# Patient Record
Sex: FEMALE | Race: ASIAN | NOT HISPANIC OR LATINO | URBAN - METROPOLITAN AREA
[De-identification: names, ages, dates, MRNs, and addresses within clinical notes are randomized per-mention and may not be internally consistent; named-entity substitution may affect disease eponyms.]

---

## 2023-04-03 ENCOUNTER — WEB ENCOUNTER (OUTPATIENT)
Dept: URBAN - METROPOLITAN AREA CLINIC 88 | Facility: CLINIC | Age: 43
End: 2023-04-03

## 2023-04-04 ENCOUNTER — OFFICE VISIT (OUTPATIENT)
Dept: URBAN - METROPOLITAN AREA CLINIC 88 | Facility: CLINIC | Age: 43
End: 2023-04-04
Payer: COMMERCIAL

## 2023-04-04 ENCOUNTER — LAB OUTSIDE AN ENCOUNTER (OUTPATIENT)
Dept: URBAN - METROPOLITAN AREA CLINIC 88 | Facility: CLINIC | Age: 43
End: 2023-04-04

## 2023-04-04 VITALS
WEIGHT: 153.4 LBS | HEIGHT: 61 IN | TEMPERATURE: 97.7 F | SYSTOLIC BLOOD PRESSURE: 128 MMHG | DIASTOLIC BLOOD PRESSURE: 84 MMHG | HEART RATE: 84 BPM | BODY MASS INDEX: 28.96 KG/M2

## 2023-04-04 DIAGNOSIS — K58.2 IRRITABLE BOWEL SYNDROME WITH BOTH CONSTIPATION AND DIARRHEA: ICD-10-CM

## 2023-04-04 DIAGNOSIS — R19.7 DIARRHEA OF PRESUMED INFECTIOUS ORIGIN: ICD-10-CM

## 2023-04-04 DIAGNOSIS — R63.4 UNEXPLAINED WEIGHT LOSS: ICD-10-CM

## 2023-04-04 DIAGNOSIS — R11.0 NAUSEA: ICD-10-CM

## 2023-04-04 DIAGNOSIS — R10.13 EPIGASTRIC ABDOMINAL PAIN: ICD-10-CM

## 2023-04-04 DIAGNOSIS — R10.11 RUQ ABDOMINAL PAIN: ICD-10-CM

## 2023-04-04 PROBLEM — 10743008: Status: ACTIVE | Noted: 2023-04-04

## 2023-04-04 PROCEDURE — 99204 OFFICE O/P NEW MOD 45 MIN: CPT | Performed by: NURSE PRACTITIONER

## 2023-04-04 RX ORDER — PANTOPRAZOLE SODIUM 40 MG/1
1 TABLET TABLET, DELAYED RELEASE ORAL
Qty: 90 | Refills: 1 | OUTPATIENT
Start: 2023-04-04

## 2023-04-04 NOTE — PHYSICAL EXAM GASTROINTESTINAL
Abdomen , soft, RUQ, epigastric tenderness, nondistended , no guarding or rigidity , no masses palpable , normal bowel sounds , Liver and Spleen: no hepatosplenomegaly

## 2023-04-14 LAB
CALPROTECTIN, FECAL: 534
CAMPYLOBACTER SPP. AG,EIA: (no result)
CLOSTRIDIUM DIFFICILE: (no result)
SALMONELLA AND SHIGELLA, CULTURE: (no result)
SHIGA TOXINS, EIA W/RFL TO E.COLI O157 CULTURE: (no result)

## 2023-04-21 ENCOUNTER — TELEPHONE ENCOUNTER (OUTPATIENT)
Dept: URBAN - METROPOLITAN AREA CLINIC 70 | Facility: CLINIC | Age: 43
End: 2023-04-21

## 2023-04-21 ENCOUNTER — LAB OUTSIDE AN ENCOUNTER (OUTPATIENT)
Dept: URBAN - METROPOLITAN AREA CLINIC 70 | Facility: CLINIC | Age: 43
End: 2023-04-21

## 2023-04-26 ENCOUNTER — OFFICE VISIT (OUTPATIENT)
Dept: URBAN - METROPOLITAN AREA SURGERY CENTER 23 | Facility: SURGERY CENTER | Age: 43
End: 2023-04-26
Payer: COMMERCIAL

## 2023-04-26 DIAGNOSIS — K29.60 ADENOPAPILLOMATOSIS GASTRICA: ICD-10-CM

## 2023-04-26 DIAGNOSIS — R19.7 ACUTE DIARRHEA: ICD-10-CM

## 2023-04-26 DIAGNOSIS — R10.84 ABDOMINAL CRAMPING, GENERALIZED: ICD-10-CM

## 2023-04-26 DIAGNOSIS — B96.81 BACTERIAL INFECTION DUE TO H. PYLORI: ICD-10-CM

## 2023-04-26 PROCEDURE — 43239 EGD BIOPSY SINGLE/MULTIPLE: CPT | Performed by: INTERNAL MEDICINE

## 2023-04-26 PROCEDURE — G8907 PT DOC NO EVENTS ON DISCHARG: HCPCS | Performed by: INTERNAL MEDICINE

## 2023-04-26 PROCEDURE — 45380 COLONOSCOPY AND BIOPSY: CPT | Performed by: INTERNAL MEDICINE

## 2023-04-26 RX ORDER — PANTOPRAZOLE SODIUM 40 MG/1
1 TABLET TABLET, DELAYED RELEASE ORAL
Qty: 90 | Refills: 1 | Status: ACTIVE | COMMUNITY
Start: 2023-04-04

## 2023-05-03 ENCOUNTER — TELEPHONE ENCOUNTER (OUTPATIENT)
Dept: URBAN - METROPOLITAN AREA CLINIC 70 | Facility: CLINIC | Age: 43
End: 2023-05-03

## 2023-05-03 RX ORDER — DICYCLOMINE HYDROCHLORIDE 10 MG/1
1 CAPSULE CAPSULE ORAL
Qty: 60 | Refills: 1 | OUTPATIENT
Start: 2023-05-04 | End: 2023-07-03

## 2023-05-04 ENCOUNTER — LAB OUTSIDE AN ENCOUNTER (OUTPATIENT)
Dept: URBAN - METROPOLITAN AREA CLINIC 70 | Facility: CLINIC | Age: 43
End: 2023-05-04

## 2023-05-04 PROBLEM — 35298007: Status: ACTIVE | Noted: 2023-05-04

## 2023-05-24 ENCOUNTER — CLAIMS CREATED FROM THE CLAIM WINDOW (OUTPATIENT)
Dept: URBAN - METROPOLITAN AREA CLINIC 88 | Facility: CLINIC | Age: 43
End: 2023-05-24
Payer: COMMERCIAL

## 2023-05-24 VITALS
BODY MASS INDEX: 28.26 KG/M2 | SYSTOLIC BLOOD PRESSURE: 119 MMHG | WEIGHT: 153.6 LBS | HEIGHT: 62 IN | TEMPERATURE: 97.9 F | HEART RATE: 81 BPM | DIASTOLIC BLOOD PRESSURE: 82 MMHG

## 2023-05-24 DIAGNOSIS — A04.8 HELICOBACTER PYLORI (H. PYLORI) INFECTION: ICD-10-CM

## 2023-05-24 DIAGNOSIS — R10.13 EPIGASTRIC ABDOMINAL PAIN: ICD-10-CM

## 2023-05-24 DIAGNOSIS — K58.1 IRRITABLE BOWEL SYNDROME WITH CONSTIPATION: ICD-10-CM

## 2023-05-24 DIAGNOSIS — K21.9 GASTROESOPHAGEAL REFLUX DISEASE WITHOUT ESOPHAGITIS: ICD-10-CM

## 2023-05-24 PROBLEM — 440630006: Status: ACTIVE | Noted: 2023-05-24

## 2023-05-24 PROCEDURE — 99214 OFFICE O/P EST MOD 30 MIN: CPT | Performed by: NURSE PRACTITIONER

## 2023-05-24 PROCEDURE — 99214 OFFICE O/P EST MOD 30 MIN: CPT | Performed by: INTERNAL MEDICINE

## 2023-05-24 RX ORDER — BISMUTH SUBCITRATE POTASSIUM, METRONIDAZOLE, TETRACYCLINE HYDROCHLORIDE 140; 125; 125 MG/1; MG/1; MG/1
3 CAPSULES AFTER MEALS AND AT BEDTIME CAPSULE ORAL
Qty: 120 | OUTPATIENT
Start: 2023-05-24 | End: 2023-06-03

## 2023-05-24 RX ORDER — FLUCONAZOLE 150 MG/1
1 TABLET TABLET ORAL
Qty: 2 | Refills: 0 | OUTPATIENT
Start: 2023-05-24 | End: 2023-05-26

## 2023-05-24 RX ORDER — DICYCLOMINE HYDROCHLORIDE 10 MG/1
1 CAPSULE CAPSULE ORAL
Qty: 60 | Refills: 1 | Status: ACTIVE | COMMUNITY
Start: 2023-05-04 | End: 2023-07-03

## 2023-05-24 RX ORDER — PANTOPRAZOLE SODIUM 40 MG/1
1 TABLET TABLET, DELAYED RELEASE ORAL
OUTPATIENT
Start: 2023-04-04

## 2023-05-24 RX ORDER — PANTOPRAZOLE SODIUM 40 MG/1
1 TABLET TABLET, DELAYED RELEASE ORAL
Qty: 90 | Refills: 1 | Status: ACTIVE | COMMUNITY
Start: 2023-04-04

## 2023-05-24 NOTE — HPI-TODAY'S VISIT:
Patient presents for follow up visit after undergoing EGD and colonoscopy procedures on 04/26/2023.  EGD:  Normal esophagus, h. pylori gastritis, normal duodenum (benign bx). Colonoscopy:  Diverticulosis in descending and ascending colon, normal TI (benign), IH and benign random biopsies.   RUQ  04/06/2023:  cholelithiasis w/o cholecystitis and fatty liver. Continues to voice intermittent pain (cramping) to epigastric region intermittently.  States pain became intense 2 days after procedure before gradually improving.  Currently describes it as a tolerable or "bearable" that can intensify at random times.  Currently defecation occurs once every other day wtih stools described as being type 1 on stool scale.  Initial use of once a day Benefiber lead to improvement in this but feels has become less effective.  With adequate release of bowels her symptoms improve slightly.  Patient has vacation planned for next week and has questiosn about starting antibiotics.

## 2023-05-24 NOTE — PHYSICAL EXAM GASTROINTESTINAL
Abdomen , soft, epigastric tenderness, nondistended , no guarding or rigidity , no masses palpable , normal bowel sounds , Liver and Spleen:  no hepatosplenomegaly

## 2023-05-24 NOTE — HPI-OTHER HISTORIES
--------------------------------------------------------------------------- Last office note 04/04/2023: Presents today, along with spouse, for evaluation of epigastric pain.  This pain has been present intermittent x 6 months.  Describes pain as a burning sensation that appears to be worse during the night.  Duration:  hours.  Associated symptoms:  15 lb over the last 2 months, nausea, occasional vomiting, changes in bowel habits, excessive flatulence and bloating.   Alleviating symptoms:  treatment for h. pylori by PCP improved symptoms slightly, Pepto-Bismol, defecation and belching.   Aggravating:  eating in general.   Also voices constipation alternating diarrhea that has been occurring over the month.  Voices hx of constipation with defecation occurring once every 2-3 days.  May not achieve a complete sense of evacuation.  First stool is usually formed followed by non-bloody loose stools.  Diarrhea episodes may last for a few days before gradually subsiding.  Denies family hx of colon cancer, IBD.  Started taking oral iron tablets a month ago and has noticed a darker color to stool.  Voices a normal KUB by her PCP a month ago.    Denies prior colonoscopy or EGD.

## 2023-06-14 ENCOUNTER — TELEPHONE ENCOUNTER (OUTPATIENT)
Dept: URBAN - METROPOLITAN AREA CLINIC 70 | Facility: CLINIC | Age: 43
End: 2023-06-14

## 2023-07-05 LAB
H PYLORI BREATH TEST: NOT DETECTED
H. PYLORI BREATH TEST: NOT DETECTED
INTERPRETATION: NOT DETECTED

## 2023-07-06 ENCOUNTER — TELEPHONE ENCOUNTER (OUTPATIENT)
Dept: URBAN - METROPOLITAN AREA CLINIC 70 | Facility: CLINIC | Age: 43
End: 2023-07-06

## 2023-07-06 RX ORDER — PANTOPRAZOLE SODIUM 40 MG/1
1 TABLET TABLET, DELAYED RELEASE ORAL
Status: ACTIVE | COMMUNITY
Start: 2023-04-04

## 2023-07-06 RX ORDER — PLECANATIDE 3 MG/1
1 TABLET TABLET ORAL
Qty: 90 | Refills: 3 | OUTPATIENT
Start: 2023-07-10 | End: 2024-07-04

## 2023-07-10 ENCOUNTER — LAB OUTSIDE AN ENCOUNTER (OUTPATIENT)
Dept: URBAN - METROPOLITAN AREA CLINIC 70 | Facility: CLINIC | Age: 43
End: 2023-07-10

## 2023-07-10 PROBLEM — 70342003: Status: ACTIVE | Noted: 2023-07-10

## 2023-07-12 ENCOUNTER — OFFICE VISIT (OUTPATIENT)
Dept: URBAN - METROPOLITAN AREA CLINIC 88 | Facility: CLINIC | Age: 43
End: 2023-07-12

## 2023-07-31 ENCOUNTER — OFFICE VISIT (OUTPATIENT)
Dept: URBAN - METROPOLITAN AREA CLINIC 88 | Facility: CLINIC | Age: 43
End: 2023-07-31
Payer: COMMERCIAL

## 2023-07-31 ENCOUNTER — WEB ENCOUNTER (OUTPATIENT)
Dept: URBAN - METROPOLITAN AREA CLINIC 88 | Facility: CLINIC | Age: 43
End: 2023-07-31

## 2023-07-31 VITALS
HEART RATE: 88 BPM | BODY MASS INDEX: 28.63 KG/M2 | WEIGHT: 155.6 LBS | DIASTOLIC BLOOD PRESSURE: 79 MMHG | TEMPERATURE: 97.7 F | SYSTOLIC BLOOD PRESSURE: 118 MMHG | HEIGHT: 62 IN

## 2023-07-31 DIAGNOSIS — K58.1 IRRITABLE BOWEL SYNDROME WITH CONSTIPATION: ICD-10-CM

## 2023-07-31 DIAGNOSIS — K80.20 CALCULUS OF GALLBLADDER WITHOUT CHOLECYSTITIS WITHOUT OBSTRUCTION: ICD-10-CM

## 2023-07-31 DIAGNOSIS — K21.9 GERD WITHOUT ESOPHAGITIS: ICD-10-CM

## 2023-07-31 DIAGNOSIS — R10.11 RUQ ABDOMINAL PAIN: ICD-10-CM

## 2023-07-31 DIAGNOSIS — R10.13 EPIGASTRIC ABDOMINAL PAIN: ICD-10-CM

## 2023-07-31 PROBLEM — 266435005: Status: ACTIVE | Noted: 2023-07-31

## 2023-07-31 PROCEDURE — 99214 OFFICE O/P EST MOD 30 MIN: CPT | Performed by: NURSE PRACTITIONER

## 2023-07-31 RX ORDER — PLECANATIDE 3 MG/1
1 TABLET TABLET ORAL
OUTPATIENT
Start: 2023-07-10

## 2023-07-31 RX ORDER — PLECANATIDE 3 MG/1
1 TABLET TABLET ORAL
Qty: 90 | Refills: 3 | Status: ACTIVE | COMMUNITY
Start: 2023-07-10 | End: 2024-07-04

## 2023-07-31 RX ORDER — PANTOPRAZOLE SODIUM 40 MG/1
1 TABLET TABLET, DELAYED RELEASE ORAL
Status: ACTIVE | COMMUNITY
Start: 2023-04-04

## 2023-07-31 RX ORDER — PANTOPRAZOLE SODIUM 40 MG/1
1 TABLET TABLET, DELAYED RELEASE ORAL
Qty: 90 | Refills: 1
Start: 2023-04-04

## 2023-07-31 NOTE — HPI-TODAY'S VISIT:
Presents for follow up regarding GERD and IBS-C.  Completed treatment for h. pylori with subsequent breath test confirming eradication.  Continues to voice intermittent epigastric pain that radiates to RUQ area.  Describes it as an intermittent discomfort that she feels is worse during the night.  Usually eats dinner around 1700.  Experiences a "knot" sensation to epigastric region that lasts for minutes.  This pain can radiate to her back between her shoulder blades.  Also experiences intermittent episodes of nausea w/o vomiting.  Takes a dose of Pepto-Bismol as needed, which helps to sooth pain or induce belching.  Remains on daily pantoprazole for reflux.  Of note, voices normal EKG by PCP 2 months ago.  IBS-C:  Takes Trulance as needed.  Defecation occurs daily to every 2 days.  Intermittent periods of diarrhea may occur.  Voices a complete sense of evacuation with diarrhea spells.  Started taking fiber supplement, along with adhering to a high fiber diet to manage constipation.     EGD and colonoscopy procedures on 04/26/2023.  EGD:  Normal esophagus, h. pylori gastritis, normal duodenum (benign bx). Colonoscopy:  Diverticulosis in descending and ascending colon, normal TI (benign), IH and benign random biopsies.   RUQ US 04/06/2023:  cholelithiasis w/o cholecystitis and fatty liver.  HIDA scan was ordered at [facility] but has not been completed as of this visit.

## 2023-07-31 NOTE — PHYSICAL EXAM GASTROINTESTINAL
Abdomen , soft, RUQ w/o rebound tenderness, epigastric and supraumbilical tenderness, nondistended , no guarding or rigidity , no masses palpable , normal bowel sounds , Liver and Spleen:  no hepatosplenomegaly

## 2023-07-31 NOTE — HPI-OTHER HISTORIES
--------------------------------------------------------------------------- Last office note 05/24/2023: Patient presents for follow up visit after undergoing EGD and colonoscopy procedures on 04/26/2023.  EGD:  Normal esophagus, h. pylori gastritis, normal duodenum (benign bx). Colonoscopy:  Diverticulosis in descending and ascending colon, normal TI (benign), IH and benign random biopsies.   RUQ US 04/06/2023:  cholelithiasis w/o cholecystitis and fatty liver. Continues to voice intermittent pain (cramping) to epigastric region intermittently.  States pain became intense 2 days after procedure before gradually improving.  Currently describes it as a tolerable or "bearable" that can intensify at random times.  Currently defecation occurs once every other day wtih stools described as being type 1 on stool scale.  Initial use of once a day Benefiber lead to improvement in this but feels has become less effective.  With adequate release of bowels her symptoms improve slightly.  Patient has vacation planned for next week and has questiosn about starting antibiotics.

## 2023-09-11 ENCOUNTER — OFFICE VISIT (OUTPATIENT)
Dept: URBAN - METROPOLITAN AREA CLINIC 88 | Facility: CLINIC | Age: 43
End: 2023-09-11

## 2023-09-11 RX ORDER — PLECANATIDE 3 MG/1
1 TABLET TABLET ORAL
Status: ACTIVE | COMMUNITY
Start: 2023-07-10

## 2023-09-11 RX ORDER — PANTOPRAZOLE SODIUM 40 MG/1
1 TABLET TABLET, DELAYED RELEASE ORAL
Qty: 90 | Refills: 1 | Status: ACTIVE | COMMUNITY
Start: 2023-04-04

## 2023-10-31 ENCOUNTER — OFFICE VISIT (OUTPATIENT)
Dept: URBAN - METROPOLITAN AREA CLINIC 88 | Facility: CLINIC | Age: 43
End: 2023-10-31
Payer: COMMERCIAL

## 2023-10-31 VITALS
BODY MASS INDEX: 28.52 KG/M2 | WEIGHT: 155 LBS | OXYGEN SATURATION: 100 % | TEMPERATURE: 97.2 F | SYSTOLIC BLOOD PRESSURE: 119 MMHG | HEIGHT: 62 IN | DIASTOLIC BLOOD PRESSURE: 82 MMHG | HEART RATE: 80 BPM

## 2023-10-31 DIAGNOSIS — K58.1 IRRITABLE BOWEL SYNDROME WITH CONSTIPATION: ICD-10-CM

## 2023-10-31 DIAGNOSIS — R10.11 RUQ ABDOMINAL PAIN: ICD-10-CM

## 2023-10-31 DIAGNOSIS — K80.20 CALCULUS OF GALLBLADDER WITHOUT CHOLECYSTITIS WITHOUT OBSTRUCTION: ICD-10-CM

## 2023-10-31 DIAGNOSIS — K21.9 GERD WITHOUT ESOPHAGITIS: ICD-10-CM

## 2023-10-31 PROCEDURE — 99214 OFFICE O/P EST MOD 30 MIN: CPT | Performed by: NURSE PRACTITIONER

## 2023-10-31 RX ORDER — PANTOPRAZOLE SODIUM 40 MG/1
1 TABLET TABLET, DELAYED RELEASE ORAL
Qty: 90 | Refills: 1

## 2023-10-31 RX ORDER — PLECANATIDE 3 MG/1
1 TABLET TABLET ORAL
OUTPATIENT

## 2023-10-31 RX ORDER — PLECANATIDE 3 MG/1
1 TABLET TABLET ORAL
Status: ACTIVE | COMMUNITY
Start: 2023-07-10

## 2023-10-31 RX ORDER — PANTOPRAZOLE SODIUM 40 MG/1
1 TABLET TABLET, DELAYED RELEASE ORAL
Qty: 90 | Refills: 1 | Status: ACTIVE | COMMUNITY
Start: 2023-04-04

## 2023-10-31 NOTE — HPI-OTHER HISTORIES
------------------------------------------------------------------------------ Last office note 07/31/2023: Presents for follow up regarding GERD and IBS-C. Completed treatment for h. pylori with subsequent breath test confirming eradication. Continues to voice intermittent epigastric pain that radiates to RUQ area. Describes it as an intermittent discomfort that she feels is worse during the night. Usually eats dinner around 1700. Experiences a "knot" sensation to epigastric region that lasts for minutes. This pain can radiate to her back between her shoulder blades. Also experiences intermittent episodes of nausea w/o vomiting. Takes a dose of Pepto-Bismol as needed, which helps to sooth pain or induce belching. Remains on daily pantoprazole for reflux. Of note, voices normal EKG by PCP 2 months ago.  IBS-C: Takes Trulance as needed. Defecation occurs daily to every 2 days. Intermittent periods of diarrhea may occur. Voices a complete sense of evacuation with diarrhea spells. Started taking fiber supplement, along with adhering to a high fiber diet to manage constipation.  EGD and colonoscopy procedures on 04/26/2023. EGD: Normal esophagus, h. pylori gastritis, normal duodenum (benign bx). Colonoscopy: Diverticulosis in descending and ascending colon, normal TI (benign), IH and benign random biopsies. RUST 04/06/2023: cholelithiasis w/o cholecystitis and fatty liver. HIDA scan was ordered at [facility] but has not been completed as of this visit.

## 2023-10-31 NOTE — HPI-TODAY'S VISIT:
Patient with hx of IBS-C and GERD presenting with continued c/o intermittent RUQ abdominal pain.  HIDA scan on 08/15/2023 was normal at 85%.  RUQ US 04/06/2023: cholelithiasis w/o cholecystitis and fatty liver. Describes her RUQ pain as being a heaviness that radiates to her right middle back.  At times, her back discomfort can be a dull ache or tightness.  This pain is not affected by movement or palpation over her right middle back.  Voices intermittent nausea w/o vomiting.  Denies fever, chills or signs of jaundice.   Pain tends to be worse at night.  Takes Trulance once every 2-3 days which relieves pressure.  Defecation typicall coccurs once every day but may not achieve a complete sense of evacuation.  EGD and colonoscopy procedures on 04/26/2023. EGD: Normal esophagus, h. pylori gastritis, normal duodenum (benign bx). Colonoscopy: Diverticulosis in descending and ascending colon, normal TI (benign), IH and benign random biopsies.

## 2023-11-01 PROBLEM — 197321007: Status: ACTIVE | Noted: 2023-11-01

## 2023-11-07 LAB
A/G RATIO: 1.3
ABSOLUTE BASOPHILS: 58
ABSOLUTE EOSINOPHILS: 241
ABSOLUTE LYMPHOCYTES: 3300
ABSOLUTE MONOCYTES: 445
ABSOLUTE NEUTROPHILS: 3256
ALBUMIN: 4.3
ALKALINE PHOSPHATASE: 66
ALT (SGPT): 23
AST (SGOT): 18
BASOPHILS: 0.8
BILIRUBIN, TOTAL: 0.3
BUN/CREATININE RATIO: (no result)
BUN: 13
CALCIUM: 9.5
CARBON DIOXIDE, TOTAL: 23
CHLORIDE: 105
CREATININE: 0.63
EGFR: 114
EOSINOPHILS: 3.3
GLOBULIN, TOTAL: 3.2
GLUCOSE: 82
HEMATOCRIT: 40.3
HEMOGLOBIN: 12.3
LIPASE: 38
LYMPHOCYTES: 45.2
MCH: 23.7
MCHC: 30.5
MCV: 77.8
MONOCYTES: 6.1
MPV: 10.4
NEUTROPHILS: 44.6
PLATELET COUNT: 409
POTASSIUM: 4.7
PROTEIN, TOTAL: 7.5
RDW: 14.4
RED BLOOD CELL COUNT: 5.18
SODIUM: 138
WHITE BLOOD CELL COUNT: 7.3

## 2023-12-08 ENCOUNTER — DASHBOARD ENCOUNTERS (OUTPATIENT)
Age: 43
End: 2023-12-08

## 2023-12-12 ENCOUNTER — OFFICE VISIT (OUTPATIENT)
Dept: URBAN - METROPOLITAN AREA CLINIC 88 | Facility: CLINIC | Age: 43
End: 2023-12-12

## 2023-12-12 RX ORDER — PANTOPRAZOLE SODIUM 40 MG/1
1 TABLET TABLET, DELAYED RELEASE ORAL
Qty: 90 | Refills: 1 | Status: ACTIVE | COMMUNITY

## 2023-12-12 RX ORDER — PLECANATIDE 3 MG/1
1 TABLET TABLET ORAL
Status: ACTIVE | COMMUNITY

## 2024-07-24 ENCOUNTER — ERX REFILL RESPONSE (OUTPATIENT)
Dept: URBAN - METROPOLITAN AREA CLINIC 70 | Facility: CLINIC | Age: 44
End: 2024-07-24

## 2024-07-24 RX ORDER — PANTOPRAZOLE SODIUM 40 MG/1
TAKE 1 TABLET BY MOUTH EVERY DAY FOR REFLUX TABLET, DELAYED RELEASE ORAL
Qty: 90 TABLET | Refills: 1 | OUTPATIENT

## 2024-07-24 RX ORDER — PANTOPRAZOLE SODIUM 40 MG/1
TAKE 1 TABLET BY MOUTH EVERY DAY FOR REFLUX TABLET, DELAYED RELEASE ORAL
Qty: 90 TABLET | Refills: 0 | OUTPATIENT

## 2025-01-02 ENCOUNTER — TELEPHONE ENCOUNTER (OUTPATIENT)
Dept: URBAN - METROPOLITAN AREA CLINIC 70 | Facility: CLINIC | Age: 45
End: 2025-01-02

## 2025-01-02 ENCOUNTER — ERX REFILL RESPONSE (OUTPATIENT)
Dept: URBAN - METROPOLITAN AREA CLINIC 70 | Facility: CLINIC | Age: 45
End: 2025-01-02

## 2025-01-02 RX ORDER — PLECANATIDE 3 MG/1
1 TABLET TABLET ORAL
Qty: 60 | Refills: 0 | OUTPATIENT

## 2025-01-02 RX ORDER — PLECANATIDE 3 MG/1
1 TABLET TABLET ORAL
OUTPATIENT

## 2025-01-16 ENCOUNTER — OFFICE VISIT (OUTPATIENT)
Dept: URBAN - METROPOLITAN AREA CLINIC 88 | Facility: CLINIC | Age: 45
End: 2025-01-16